# Patient Record
Sex: MALE | Race: OTHER | NOT HISPANIC OR LATINO | URBAN - METROPOLITAN AREA
[De-identification: names, ages, dates, MRNs, and addresses within clinical notes are randomized per-mention and may not be internally consistent; named-entity substitution may affect disease eponyms.]

---

## 2017-03-22 ENCOUNTER — INPATIENT (INPATIENT)
Facility: HOSPITAL | Age: 52
LOS: 1 days | Discharge: HOME | DRG: 287 | End: 2017-03-24
Attending: INTERNAL MEDICINE | Admitting: INTERNAL MEDICINE
Payer: COMMERCIAL

## 2017-03-22 VITALS
SYSTOLIC BLOOD PRESSURE: 134 MMHG | OXYGEN SATURATION: 96 % | RESPIRATION RATE: 18 BRPM | DIASTOLIC BLOOD PRESSURE: 92 MMHG | TEMPERATURE: 99 F | HEART RATE: 108 BPM

## 2017-03-22 PROCEDURE — 93010 ELECTROCARDIOGRAM REPORT: CPT | Mod: NC

## 2017-03-22 PROCEDURE — 99285 EMERGENCY DEPT VISIT HI MDM: CPT | Mod: 25

## 2017-03-22 PROCEDURE — 99222 1ST HOSP IP/OBS MODERATE 55: CPT

## 2017-03-22 PROCEDURE — 71010: CPT | Mod: 26

## 2017-03-22 PROCEDURE — 71275 CT ANGIOGRAPHY CHEST: CPT | Mod: 26

## 2017-03-22 PROCEDURE — 74174 CTA ABD&PLVS W/CONTRAST: CPT | Mod: 26

## 2017-03-22 PROCEDURE — 75574 CT ANGIO HRT W/3D IMAGE: CPT | Mod: 26

## 2017-03-22 RX ORDER — ASPIRIN/CALCIUM CARB/MAGNESIUM 324 MG
162 TABLET ORAL DAILY
Qty: 0 | Refills: 0 | Status: DISCONTINUED | OUTPATIENT
Start: 2017-03-22 | End: 2017-03-23

## 2017-03-22 RX ORDER — NITROGLYCERIN 6.5 MG
0.4 CAPSULE, EXTENDED RELEASE ORAL
Qty: 0 | Refills: 0 | Status: DISCONTINUED | OUTPATIENT
Start: 2017-03-22 | End: 2017-03-24

## 2017-03-22 RX ORDER — CLOPIDOGREL BISULFATE 75 MG/1
600 TABLET, FILM COATED ORAL ONCE
Qty: 0 | Refills: 0 | Status: COMPLETED | OUTPATIENT
Start: 2017-03-22 | End: 2017-03-22

## 2017-03-22 RX ORDER — SODIUM CHLORIDE 9 MG/ML
1000 INJECTION INTRAMUSCULAR; INTRAVENOUS; SUBCUTANEOUS ONCE
Qty: 0 | Refills: 0 | Status: COMPLETED | OUTPATIENT
Start: 2017-03-22 | End: 2017-03-22

## 2017-03-22 RX ORDER — LOSARTAN POTASSIUM 100 MG/1
25 TABLET, FILM COATED ORAL DAILY
Qty: 0 | Refills: 0 | Status: DISCONTINUED | OUTPATIENT
Start: 2017-03-22 | End: 2017-03-24

## 2017-03-22 RX ORDER — ATORVASTATIN CALCIUM 80 MG/1
80 TABLET, FILM COATED ORAL ONCE
Qty: 0 | Refills: 0 | Status: COMPLETED | OUTPATIENT
Start: 2017-03-22 | End: 2017-03-22

## 2017-03-22 RX ORDER — METOPROLOL TARTRATE 50 MG
50 TABLET ORAL ONCE
Qty: 0 | Refills: 0 | Status: COMPLETED | OUTPATIENT
Start: 2017-03-22 | End: 2017-03-22

## 2017-03-22 RX ORDER — ACETAMINOPHEN 500 MG
650 TABLET ORAL EVERY 6 HOURS
Qty: 0 | Refills: 0 | Status: DISCONTINUED | OUTPATIENT
Start: 2017-03-22 | End: 2017-03-24

## 2017-03-22 RX ORDER — MORPHINE SULFATE 50 MG/1
2 CAPSULE, EXTENDED RELEASE ORAL ONCE
Qty: 0 | Refills: 0 | Status: DISCONTINUED | OUTPATIENT
Start: 2017-03-22 | End: 2017-03-22

## 2017-03-22 RX ORDER — CARVEDILOL PHOSPHATE 80 MG/1
6.25 CAPSULE, EXTENDED RELEASE ORAL EVERY 12 HOURS
Qty: 0 | Refills: 0 | Status: DISCONTINUED | OUTPATIENT
Start: 2017-03-22 | End: 2017-03-24

## 2017-03-22 RX ADMIN — Medication 1 MILLIGRAM(S): at 16:00

## 2017-03-22 RX ADMIN — ATORVASTATIN CALCIUM 80 MILLIGRAM(S): 80 TABLET, FILM COATED ORAL at 21:28

## 2017-03-22 RX ADMIN — Medication 50 MILLIGRAM(S): at 16:15

## 2017-03-22 RX ADMIN — CLOPIDOGREL BISULFATE 600 MILLIGRAM(S): 75 TABLET, FILM COATED ORAL at 21:28

## 2017-03-22 RX ADMIN — Medication 162 MILLIGRAM(S): at 21:28

## 2017-03-22 RX ADMIN — Medication 50 MILLIGRAM(S): at 15:10

## 2017-03-22 RX ADMIN — SODIUM CHLORIDE 1000 MILLILITER(S): 9 INJECTION INTRAMUSCULAR; INTRAVENOUS; SUBCUTANEOUS at 15:10

## 2017-03-22 RX ADMIN — MORPHINE SULFATE 2 MILLIGRAM(S): 50 CAPSULE, EXTENDED RELEASE ORAL at 15:10

## 2017-03-22 RX ADMIN — MORPHINE SULFATE 2 MILLIGRAM(S): 50 CAPSULE, EXTENDED RELEASE ORAL at 16:15

## 2017-03-22 NOTE — H&P ADULT - HISTORY OF PRESENT ILLNESS
1.  The calcium score is minimally elevated at 3 Agatston units, which is   at the 59th percentile, adjusted for age, gender and race.  2.  There is moderate stenosis in the proximal LAD  and the ramus   intermedius due to noncalcified plaque.  3.  Dyskinesis of the anteroseptum, with mildly reduced ejection   fraction. Correlation with echocardiography is recommended. Patient is a 51 year old male with PMHx of ETOH abuse in the past(sober 4 years), depression, anxiety, HTN, recently found to have an LVEF of 38% by echo at [facility] with no evidence of CHF and ischemic work up had not been perused. Patient began to develop chest pain this afternoon which he describes as a substernal pressure which came and went several times at rest lasting only seconds. Finally pain became constant and patient presented to ED. Currently he is comfortable. He does admit to an episode of PND a few weeks ago but denies SOB, orthopnea, palps. LE edema, dizziness or syncope. In ED troponin was negative and EKG did not reveal any ischemic changes. CCTA was performed and revealed 1.  The calcium score is minimally elevated at 3 Agatston units, which is at the 59th percentile, adjusted for age, gender and race.  2.  There is moderate stenosis in the proximal LAD  and the ramus   intermedius due to noncalcified plaque.  3.  Dyskinesis of the anteroseptum, with mildly reduced ejection   fraction. Correlation with echocardiography is recommended.  He is being admitted for Cardiac catherization.

## 2017-03-22 NOTE — H&P ADULT - NSHPLABSRESULTS_GEN_ALL_CORE
13.3   9.8   )-----------( 191      ( 22 Mar 2017 15:01 )             37.5   Comprehensive Metabolic Panel (03.22.17 @ 15:01)    Sodium, Serum: 131 mmoL/L    Potassium, Serum: 4.4 mmoL/L    Chloride, Serum: 95 mmoL/L    Carbon Dioxide, Serum: 29 mmoL/L    Anion Gap, Serum: 7 mmoL/L    Blood Urea Nitrogen, Serum: 14 mg/dL    Creatinine, Serum: 1.07 mg/dL    Glucose, Serum: 80 mg/dL    Calcium, Total Serum: 8.8 mg/dL    Protein Total, Serum: 7.2 g/dL    Albumin, Serum: 3.9 g/dL    Bilirubin Total, Serum: 0.9 mg/dL    Alkaline Phosphatase, Serum: 74 U/L    Aspartate Aminotransferase (AST/SGOT): 36 U/L    Alanine Aminotransferase (ALT/SGPT): 61 U/L    eGFR if Non : 80: Interpretative comment  The units for eGFR are ml/min/1.73m2 (normalized body surface area). The  eGFR is calculated from a serum creatinine using the CKD-EPI equation.  Other variables required for calculation are race, age and sex. Among  patients w80: ith chronic kidney disease (CKD), the eGFR is useful in  determining the stage of disease according to KDOQI CKD classification.  All eGFR results are reported numerically with the following  interpretation.          GFR                    With80:                  Without     (ml/min/1.73 m2)    Kidney Damage       Kidney Damage        >= 90                    Stage 1                     Normal        60-89                    Stage 2                     Decreased GFR        :      Stage 3                     Stage 3        15-29                    Stage 4                     Stage 4        < 15                      Stage 5                     Stage 5  Each stage of CKD assumes that the associated GFR level has been in  eff80: ect for at least 3 months. Determination of stages one and two (with  eGFR > 59 ml/min/m2) requires estimation of kidney damage for at least 3  months as defined by structural or functional abnormalities.  Limitations: All estimates of GFR will be les80: s accurate for patients at  extremes of muscle mass (including but not limited to frail elderly,  critically ill, or cancer patients), those with unusual diets, and those  with conditions associated with reduced secretion or extrarenal  elimination of80:  creatinine. The eGFR equation is not recommended for use  in patients with unstable creatinine levels. mL/min/1.73M2    eGFR if African American: 93 mL/min/1.73M2    Troponin I, Serum (03.22.17 @ 15:01)    Troponin I, Serum: <0.015: Consider AMI if greater than or equal to 0.600 ng/mL  A joint committee from the American College of Cardiology and the  American Heart Association has defined the short-term risk of death or  nonfatal myocardial infarction in patients with unstable a<0.015: ngina, in  association with troponin levels as follows:  Low risk with normal levels 0.015 - 0.045 ng/mL  Intermediate risk when slightly elevated but less than 0.100 ng/mL  High risk with greater than or equal 0.100 ng/mL  Other conditions which can<0.015: lead to myocardial injury, such as cardiac  contusion and myocarditis can cause cardiac troponin I elevations. ng/mL

## 2017-03-22 NOTE — H&P ADULT - ASSESSMENT
Patient is a 51 year old male with PMHx of ETOH abuse in the past(sober 4 years), depression, anxiety, HTN, recently found to have an LVEF of 38% by echo at [facility] with no evidence of CHF and ischemic work up had not been perused. Patient began to develop chest pain this afternoon which he describes as a substernal pressure which came and went several times at rest lasting only seconds. Finally pain became constant and patient presented to ED. Currently he is comfortable. He does admit to an episode of PND a few weeks ago but denies SOB, orthopnea, palps. LE edema, dizziness or syncope. In ED troponin was negative and EKG did not reveal any ischemic changes. CCTA was performed and revealed 1.  The calcium score is minimally elevated at 3 Agatston units, which is at the 59th percentile, adjusted for age, gender and race.  2.  There is moderate stenosis in the proximal LAD  and the ramus   intermedius due to noncalcified plaque.  3.  Dyskinesis of the anteroseptum, with mildly reduced ejection   fraction. Correlation with echocardiography is recommended.  He is being admitted for Cardiac catherization.    Admit to tele  Plan for LHC in AM  Cont BB/ACE  ASA/Plavix 600mg loaded in ED  Check Lipids

## 2017-03-22 NOTE — ED ADULT NURSE NOTE - OBJECTIVE STATEMENT
Pt BIBA CO CP 7/10 since 1145 this AM.  Pt admin Sublingual Nitro x3 and PO  mg.  Pt states "Edith been having intermittent sharp left sided CP, but this morning it was much worse."  Pt also CO associated Nausea.  Pt denies Vomiting, Diarrhea, SOB, Fevers at this time.  EKG obtained upon arrival to ED.

## 2017-03-22 NOTE — H&P ADULT - ATTENDING COMMENTS
PATIENT SEEN AND EXAMINED.  AGREE WITH NOTE ABOVE.  CARDIOMYOPATHY, UNKNOWN ETIOLOGY, +CAD BY CCTA    PLAN AS ABOVE  CATH

## 2017-03-22 NOTE — ED PROVIDER NOTE - OBJECTIVE STATEMENT
51 m co cp- cp Left sided pressure started while at rest- intermittent sharp pain  with numbness in his Left hand- no sob no n/v no rad to back/abd  pain was intermittent- then became constant- has long standing hx of htn- on arb and b-blocker- got ntg x3 and asa 162- improved from 8 to a 5  partial allev w ntg  no exacerbating  sx started at rest at his desk

## 2017-03-22 NOTE — H&P ADULT - NSHPREVIEWOFSYSTEMS_GEN_ALL_CORE
Review of Systems: Review of Systems:  · General	negative  · Skin/Breast	negative  · Ophthalmologic	negative  · ENMT	negative  · Negative Respiratory and Thorax Symptoms	no wheezing; no hemoptysis; no pleuritic chest pain  · Respiratory and Thorax Symptoms	dyspnea  cough  · Cardiovascular Symptoms see HPI  · Gastrointestinal	negative  · Genitourinary	negative  · Musculoskeletal	negative  · Neurological	negative  · Psychiatric	negative  · Hematology/Lymphatics	negative  · Endocrine	negative  · Allergic/Immunologic	negative

## 2017-03-22 NOTE — ED ADULT TRIAGE NOTE - CHIEF COMPLAINT QUOTE
biba from work for acute onset substernal chest pain and left arm pain. pt given 3 nitro and 162mg ASA by EMS

## 2017-03-22 NOTE — ED ADULT NURSE NOTE - PMH
Ejection fraction < 50%  35%, per pt on 3/22/17  HTN (hypertension)    OCD (obsessive compulsive disorder)

## 2017-03-23 DIAGNOSIS — F42.9 OBSESSIVE-COMPULSIVE DISORDER, UNSPECIFIED: ICD-10-CM

## 2017-03-23 DIAGNOSIS — I10 ESSENTIAL (PRIMARY) HYPERTENSION: ICD-10-CM

## 2017-03-23 DIAGNOSIS — I20.0 UNSTABLE ANGINA: ICD-10-CM

## 2017-03-23 PROCEDURE — 93306 TTE W/DOPPLER COMPLETE: CPT | Mod: 26

## 2017-03-23 PROCEDURE — 99231 SBSQ HOSP IP/OBS SF/LOW 25: CPT

## 2017-03-23 PROCEDURE — 93458 L HRT ARTERY/VENTRICLE ANGIO: CPT | Mod: 26

## 2017-03-23 PROCEDURE — 93571 IV DOP VEL&/PRESS C FLO 1ST: CPT | Mod: 26,LD

## 2017-03-23 RX ORDER — ASPIRIN/CALCIUM CARB/MAGNESIUM 324 MG
81 TABLET ORAL DAILY
Qty: 0 | Refills: 0 | Status: DISCONTINUED | OUTPATIENT
Start: 2017-03-23 | End: 2017-03-24

## 2017-03-23 RX ORDER — ATORVASTATIN CALCIUM 80 MG/1
20 TABLET, FILM COATED ORAL AT BEDTIME
Qty: 0 | Refills: 0 | Status: DISCONTINUED | OUTPATIENT
Start: 2017-03-23 | End: 2017-03-24

## 2017-03-23 RX ORDER — CLOPIDOGREL BISULFATE 75 MG/1
75 TABLET, FILM COATED ORAL DAILY
Qty: 0 | Refills: 0 | Status: DISCONTINUED | OUTPATIENT
Start: 2017-03-23 | End: 2017-03-23

## 2017-03-23 RX ORDER — SODIUM CHLORIDE 9 MG/ML
1000 INJECTION INTRAMUSCULAR; INTRAVENOUS; SUBCUTANEOUS
Qty: 0 | Refills: 0 | Status: DISCONTINUED | OUTPATIENT
Start: 2017-03-23 | End: 2017-03-24

## 2017-03-23 RX ADMIN — Medication 60 MILLIGRAM(S): at 07:16

## 2017-03-23 RX ADMIN — LOSARTAN POTASSIUM 25 MILLIGRAM(S): 100 TABLET, FILM COATED ORAL at 07:17

## 2017-03-23 RX ADMIN — SODIUM CHLORIDE 50 MILLILITER(S): 9 INJECTION INTRAMUSCULAR; INTRAVENOUS; SUBCUTANEOUS at 13:35

## 2017-03-23 RX ADMIN — Medication 162 MILLIGRAM(S): at 07:38

## 2017-03-23 RX ADMIN — ATORVASTATIN CALCIUM 20 MILLIGRAM(S): 80 TABLET, FILM COATED ORAL at 21:01

## 2017-03-23 RX ADMIN — CARVEDILOL PHOSPHATE 6.25 MILLIGRAM(S): 80 CAPSULE, EXTENDED RELEASE ORAL at 07:16

## 2017-03-23 RX ADMIN — CARVEDILOL PHOSPHATE 6.25 MILLIGRAM(S): 80 CAPSULE, EXTENDED RELEASE ORAL at 18:22

## 2017-03-23 NOTE — PROGRESS NOTE ADULT - PROBLEM SELECTOR PLAN 1
-EKG non-ischemic, CE negative x 3  -CTA Coronaries with mod stenosis in pLAD and ramus, dyskinesis of anteroseptum with mildly reduced EF. CTA neg for PE/Dissection.   -s/p Lipitor 80mg in ED, ASA/Plavix loaded  -NPO for LHC with Dr. Cano(precath consented) -EKG non-ischemic, CE negative x 2  -CTA Coronaries with mod stenosis in pLAD and ramus, dyskinesis of anteroseptum with mildly reduced EF. CTA neg for PE/Dissection.   -s/p Lipitor 80mg in ED, ASA/Plavix loaded  -NPO for LHC with Dr. Cano(precath consented)

## 2017-03-23 NOTE — PROCEDURE NOTE - SUPERVISORY STATEMENT
NICM, LIKELY DUE TO ETOH ABUSE IN PAST  - MEDICAL THERAPY WITH BB, ACE;  EPLERNONE (START THIS AS OUT PATIENT ONCE NORMAL POTASSIUM ON ACE)  - D/C PLAVIX, CONTINUE STATIN  - CONSIDER OUT PATIENT cMRI WITH CONTRAST IN FUTURE  - GENTLE IV HYDRATION (NS AT 50 CC/HR FOR 10 HOURS); CHECK AM LABS

## 2017-03-23 NOTE — PROCEDURE NOTE - ADDITIONAL PROCEDURE DETAILS
LM - NORMAL  LAD - 40% MID  RAMUS - NORMAL, < 1.5 MM VESSEL  LCX - LUMINAL IRREGULARITIES  RCA - LUMINAL IRREGULARITIES    LVEF: 35%; LVEDP 11mmHg    DIAGNOSTIC INVASIVE TESTING: FFR OF MID LAD - 0.94

## 2017-03-23 NOTE — PROGRESS NOTE ADULT - ATTENDING COMMENTS
PATIENT SEEN AND EXAMINED    AGREE WITH ABOVE    P/E: NO JVD, CTAB, NO S3, NO MURMUR, +BS, SOFT, NO CCE

## 2017-03-23 NOTE — PROGRESS NOTE ADULT - PROBLEM SELECTOR PLAN 3
-Pt on Anafranil 225mg daily(not available in house- pt does not have bottle with him)  -Continue Paroxetine 60mg daily for depression    Dispo: Pending Cath results. Case d/w Dr. Cano

## 2017-03-23 NOTE — PROGRESS NOTE ADULT - SUBJECTIVE AND OBJECTIVE BOX
Interventional Cardiology PA Adult Progress Note    Subjective Assessment: Pt seen this AM at bedside. Denies any CP, SOB, palpitations, LE edema  	  MEDICATIONS:  losartan 25milliGRAM(s) Oral daily  carvedilol 6.25milliGRAM(s) Oral every 12 hours  nitroglycerin     SubLingual 0.4milliGRAM(s) SubLingual every 5 minutes PRN        acetaminophen   Tablet. 650milliGRAM(s) Oral every 6 hours PRN  PARoxetine 60milliGRAM(s) Oral daily        aspirin  chewable 162milliGRAM(s) Oral daily      	    [PHYSICAL EXAM:  TELEMETRY: SR 70s no acute events o/n  T(C): 36.4, Max: 37.4 (03-22 @ 14:17)  HR: 77 (63 - 108)  BP: 142/77 (133/70 - 150/89)  RR: 18 (18 - 18)  SpO2: 99% (96% - 100%)  Wt(kg): --  I&O's Summary    Height (cm): 177.8 (03-23 @ 00:22)  Weight (kg): 76.8 (03-23 @ 00:22)  BMI (kg/m2): 24.3 (03-23 @ 00:22)  BSA (m2): 1.94 (03-23 @ 00:22)                                    Appearance: Normal	  HEENT:   Normal oral mucosa, PERRL, EOMI	  Neck: Supple, no JVD  Cardiovascular: Normal S1 S2, No JVD, No murmurs,   Respiratory: Lungs clear to auscultation  Gastrointestinal:  Soft, Non-tender, + BS	  Skin: No rashes, No ecchymoses, No cyanosis  Extremities: Normal range of motion, No clubbing, cyanosis or edema  Vascular: Peripheral pulses palpable 2+ bilaterally  Neurologic: Non-focal  Psychiatry: A & O x 3, Mood & affect appropriate      	    ECG:  	  RADIOLOGY:   DIAGNOSTIC TESTING:  [ ] Echocardiogram:  [ ]  Catheterization: pending  [ ] Stress Test:    [ ] ABILIO  OTHER: 	    LABS:	 	  CARDIAC MARKERS:  Troponin I, Serum: <0.015 ng/mL (03-23 @ 07:08)  Troponin I, Serum: <0.015 ng/mL (03-22 @ 15:01)          Troponin I, Serum: <0.015 ng/mL (03-23 @ 07:08)  Troponin I, Serum: <0.015 ng/mL (03-22 @ 15:01)                          13.8   5.8   )-----------( 188      ( 23 Mar 2017 07:08 )             39.7     23 Mar 2017 07:08    133    |  101    |  19     ----------------------------<  71     4.1     |  25     |  1.30     Ca    8.4        23 Mar 2017 07:08    TPro  7.2    /  Alb  3.9    /  TBili  0.9    /  DBili  x      /  AST  36     /  ALT  61     /  AlkPhos  74     22 Mar 2017 15:01    proBNP:   Lipid Profile:   HgA1c:   TSH:   PT/INR - ( 22 Mar 2017 15:01 )   PT: 12.3 sec;   INR: 1.11          PTT - ( 22 Mar 2017 15:01 )  PTT:34.4 sec      Dispo: Pending Cath results. Case d/w Dr. Cano

## 2017-03-23 NOTE — PROGRESS NOTE ADULT - ASSESSMENT
51 year old male with PMHx of ETOH abuse in the past(sober 4 years), depression, anxiety, HTN, recently found to have an LVEF of 38% by echo at [facility] with no evidence of CHF and ischemic work up had not been done presented to [facility] with unstable anginal symptoms found to have abnormal coronary CTA admitted for further ischemic workup.

## 2017-03-24 VITALS — TEMPERATURE: 98 F

## 2017-03-24 LAB
ANION GAP SERPL CALC-SCNC: 7 MMOL/L — LOW (ref 9–16)
BUN SERPL-MCNC: 18 MG/DL — SIGNIFICANT CHANGE UP (ref 7–23)
CALCIUM SERPL-MCNC: 8.7 MG/DL — SIGNIFICANT CHANGE UP (ref 8.5–10.5)
CHLORIDE SERPL-SCNC: 103 MMOL/L — SIGNIFICANT CHANGE UP (ref 96–108)
CO2 SERPL-SCNC: 28 MMOL/L — SIGNIFICANT CHANGE UP (ref 22–31)
CREAT SERPL-MCNC: 1.13 MG/DL — SIGNIFICANT CHANGE UP (ref 0.5–1.3)
GLUCOSE SERPL-MCNC: 77 MG/DL — SIGNIFICANT CHANGE UP (ref 70–99)
HCT VFR BLD CALC: 38.9 % — LOW (ref 39–50)
HGB BLD-MCNC: 13.4 G/DL — SIGNIFICANT CHANGE UP (ref 13–17)
MAGNESIUM SERPL-MCNC: 2.1 MG/DL — SIGNIFICANT CHANGE UP (ref 1.6–2.4)
MCHC RBC-ENTMCNC: 32.7 PG — SIGNIFICANT CHANGE UP (ref 27–34)
MCHC RBC-ENTMCNC: 34.4 G/DL — SIGNIFICANT CHANGE UP (ref 32–36)
MCV RBC AUTO: 94.9 FL — SIGNIFICANT CHANGE UP (ref 80–100)
PLATELET # BLD AUTO: 183 K/UL — SIGNIFICANT CHANGE UP (ref 150–400)
POTASSIUM SERPL-MCNC: 4.2 MMOL/L — SIGNIFICANT CHANGE UP (ref 3.5–5.3)
POTASSIUM SERPL-SCNC: 4.2 MMOL/L — SIGNIFICANT CHANGE UP (ref 3.5–5.3)
RBC # BLD: 4.1 M/UL — LOW (ref 4.2–5.8)
RBC # FLD: 12.5 % — SIGNIFICANT CHANGE UP (ref 10.3–16.9)
SODIUM SERPL-SCNC: 138 MMOL/L — SIGNIFICANT CHANGE UP (ref 135–145)
WBC # BLD: 6.5 K/UL — SIGNIFICANT CHANGE UP (ref 3.8–10.5)
WBC # FLD AUTO: 6.5 K/UL — SIGNIFICANT CHANGE UP (ref 3.8–10.5)

## 2017-03-24 PROCEDURE — 71045 X-RAY EXAM CHEST 1 VIEW: CPT

## 2017-03-24 PROCEDURE — 75574 CT ANGIO HRT W/3D IMAGE: CPT

## 2017-03-24 PROCEDURE — 83735 ASSAY OF MAGNESIUM: CPT

## 2017-03-24 PROCEDURE — C1887: CPT

## 2017-03-24 PROCEDURE — 80061 LIPID PANEL: CPT

## 2017-03-24 PROCEDURE — 85730 THROMBOPLASTIN TIME PARTIAL: CPT

## 2017-03-24 PROCEDURE — 93005 ELECTROCARDIOGRAM TRACING: CPT

## 2017-03-24 PROCEDURE — 96374 THER/PROPH/DIAG INJ IV PUSH: CPT | Mod: XU

## 2017-03-24 PROCEDURE — 85610 PROTHROMBIN TIME: CPT

## 2017-03-24 PROCEDURE — 71275 CT ANGIOGRAPHY CHEST: CPT

## 2017-03-24 PROCEDURE — 82553 CREATINE MB FRACTION: CPT

## 2017-03-24 PROCEDURE — 93306 TTE W/DOPPLER COMPLETE: CPT

## 2017-03-24 PROCEDURE — 85027 COMPLETE CBC AUTOMATED: CPT

## 2017-03-24 PROCEDURE — 36415 COLL VENOUS BLD VENIPUNCTURE: CPT

## 2017-03-24 PROCEDURE — 85025 COMPLETE CBC W/AUTO DIFF WBC: CPT

## 2017-03-24 PROCEDURE — 80048 BASIC METABOLIC PNL TOTAL CA: CPT

## 2017-03-24 PROCEDURE — 80053 COMPREHEN METABOLIC PANEL: CPT

## 2017-03-24 PROCEDURE — C1769: CPT

## 2017-03-24 PROCEDURE — 99238 HOSP IP/OBS DSCHRG MGMT 30/<: CPT

## 2017-03-24 PROCEDURE — 74174 CTA ABD&PLVS W/CONTRAST: CPT

## 2017-03-24 PROCEDURE — C1889: CPT

## 2017-03-24 PROCEDURE — 84484 ASSAY OF TROPONIN QUANT: CPT

## 2017-03-24 PROCEDURE — 96375 TX/PRO/DX INJ NEW DRUG ADDON: CPT | Mod: XU

## 2017-03-24 PROCEDURE — 82550 ASSAY OF CK (CPK): CPT

## 2017-03-24 PROCEDURE — 99285 EMERGENCY DEPT VISIT HI MDM: CPT | Mod: 25

## 2017-03-24 RX ORDER — CARVEDILOL PHOSPHATE 80 MG/1
1 CAPSULE, EXTENDED RELEASE ORAL
Qty: 0 | Refills: 0 | COMMUNITY
Start: 2017-03-24

## 2017-03-24 RX ORDER — LOSARTAN POTASSIUM 100 MG/1
0 TABLET, FILM COATED ORAL
Qty: 0 | Refills: 0 | COMMUNITY

## 2017-03-24 RX ORDER — ASPIRIN/CALCIUM CARB/MAGNESIUM 324 MG
1 TABLET ORAL
Qty: 0 | Refills: 0 | COMMUNITY
Start: 2017-03-24

## 2017-03-24 RX ORDER — ATORVASTATIN CALCIUM 80 MG/1
1 TABLET, FILM COATED ORAL
Qty: 30 | Refills: 0 | OUTPATIENT
Start: 2017-03-24 | End: 2017-04-23

## 2017-03-24 RX ORDER — CARVEDILOL PHOSPHATE 80 MG/1
0 CAPSULE, EXTENDED RELEASE ORAL
Qty: 0 | Refills: 0 | COMMUNITY

## 2017-03-24 RX ORDER — LOSARTAN POTASSIUM 100 MG/1
1 TABLET, FILM COATED ORAL
Qty: 0 | Refills: 0 | COMMUNITY
Start: 2017-03-24

## 2017-03-24 RX ADMIN — Medication 60 MILLIGRAM(S): at 05:47

## 2017-03-24 RX ADMIN — Medication 81 MILLIGRAM(S): at 11:20

## 2017-03-24 RX ADMIN — CARVEDILOL PHOSPHATE 6.25 MILLIGRAM(S): 80 CAPSULE, EXTENDED RELEASE ORAL at 05:47

## 2017-03-24 RX ADMIN — LOSARTAN POTASSIUM 25 MILLIGRAM(S): 100 TABLET, FILM COATED ORAL at 05:47

## 2017-03-24 NOTE — DISCHARGE NOTE ADULT - PLAN OF CARE
You came in with chest pain and were found to have an abnormal coronary cat scan and due to the history of having a low heart function underwent a Cardiac Catheterization which showed non-obstructive disease. Continue taking Carvedilol 6.25mg every Continue your home dose of Losartan 25mg daily. You have a history of elevated blood pressure and you should continue your blood pressure medications as prescribed. Continue your Paroxetine 60mg daily as prescribed. Continue taking Carvedilol 6.25mg every 12 hours daily as well as Lipitor(Atorvastatin) 20mg daily.  You underwent a coronary angiogram and should wait 3 days before returning to ordinary activities. Do not drive for 2 days. Consult your doctor before returning to vigorous activity. You may return to work in 3-5 days. The catheter from your wrist was removed and you should remove the dressing in 24 hours. You may shower once the dressing is removed, but avoid baths, hot tubs, or swimming for 5 days to prevent infection. If you notice bleeding from the site, hardening and pain at the site, drainage or redness from the site, coolness/paleness of the extremity, swelling, or fever, please call [phone].Please follow up with your Cardiologist Dr. Abernathy at [facility] in 1-2 weeks. All of your needed prescriptions have been sent electronically to your pharmacy.

## 2017-03-24 NOTE — DISCHARGE NOTE ADULT - CARE PLAN
Principal Discharge DX:	Non-ischemic cardiomyopathy  Goal:	You came in with chest pain and were found to have an abnormal coronary cat scan and due to the history of having a low heart function underwent a Cardiac Catheterization which showed non-obstructive disease.  Instructions for follow-up, activity and diet:	Continue taking Carvedilol 6.25mg every  Secondary Diagnosis:	HTN (hypertension)  Secondary Diagnosis:	Depression Principal Discharge DX:	Non-ischemic cardiomyopathy  Goal:	You came in with chest pain and were found to have an abnormal coronary cat scan and due to the history of having a low heart function underwent a Cardiac Catheterization which showed non-obstructive disease.  Instructions for follow-up, activity and diet:	Continue taking Carvedilol 6.25mg every 12 hours daily as well as Lipitor(Atorvastatin) 20mg daily.  You underwent a coronary angiogram and should wait 3 days before returning to ordinary activities. Do not drive for 2 days. Consult your doctor before returning to vigorous activity. You may return to work in 3-5 days. The catheter from your wrist was removed and you should remove the dressing in 24 hours. You may shower once the dressing is removed, but avoid baths, hot tubs, or swimming for 5 days to prevent infection. If you notice bleeding from the site, hardening and pain at the site, drainage or redness from the site, coolness/paleness of the extremity, swelling, or fever, please call [phone].Please follow up with your Cardiologist Dr. Abernathy at [facility] in 1-2 weeks. All of your needed prescriptions have been sent electronically to your pharmacy.  Secondary Diagnosis:	HTN (hypertension)  Goal:	Continue your home dose of Losartan 25mg daily.  Instructions for follow-up, activity and diet:	You have a history of elevated blood pressure and you should continue your blood pressure medications as prescribed.  Secondary Diagnosis:	Depression  Goal:	Continue your Paroxetine 60mg daily as prescribed. Principal Discharge DX:	Non-ischemic cardiomyopathy  Goal:	You came in with chest pain and were found to have an abnormal coronary cat scan and due to the history of having a low heart function underwent a Cardiac Catheterization which showed non-obstructive disease.  Instructions for follow-up, activity and diet:	Continue taking Carvedilol 6.25mg every 12 hours daily as well as Lipitor(Atorvastatin) 20mg daily.  You underwent a coronary angiogram and should wait 3 days before returning to ordinary activities. Do not drive for 2 days. Consult your doctor before returning to vigorous activity. You may return to work in 3-5 days. The catheter from your wrist was removed and you should remove the dressing in 24 hours. You may shower once the dressing is removed, but avoid baths, hot tubs, or swimming for 5 days to prevent infection. If you notice bleeding from the site, hardening and pain at the site, drainage or redness from the site, coolness/paleness of the extremity, swelling, or fever, please call [phone].Please follow up with your Cardiologist Dr. Abernathy at [facility] in 1-2 weeks. All of your needed prescriptions have been sent electronically to your pharmacy.  Secondary Diagnosis:	HTN (hypertension)  Goal:	Continue your home dose of Losartan 25mg daily.  Instructions for follow-up, activity and diet:	You have a history of elevated blood pressure and you should continue your blood pressure medications as prescribed.  Secondary Diagnosis:	Depression  Goal:	Continue your Paroxetine 60mg daily as prescribed. Principal Discharge DX:	Non-ischemic cardiomyopathy  Goal:	You came in with chest pain and were found to have an abnormal coronary cat scan and due to the history of having a low heart function underwent a Cardiac Catheterization which showed non-obstructive disease.  Instructions for follow-up, activity and diet:	Continue taking Carvedilol 6.25mg every 12 hours daily as well as Lipitor(Atorvastatin) 20mg daily.  You underwent a coronary angiogram and should wait 3 days before returning to ordinary activities. Do not drive for 2 days. Consult your doctor before returning to vigorous activity. You may return to work in 3-5 days. The catheter from your wrist was removed and you should remove the dressing in 24 hours. You may shower once the dressing is removed, but avoid baths, hot tubs, or swimming for 5 days to prevent infection. If you notice bleeding from the site, hardening and pain at the site, drainage or redness from the site, coolness/paleness of the extremity, swelling, or fever, please call [phone].Please follow up with your Cardiologist Dr. Abernathy at [facility] in 1-2 weeks. All of your needed prescriptions have been sent electronically to your pharmacy.  Secondary Diagnosis:	HTN (hypertension)  Goal:	Continue your home dose of Losartan 25mg daily.  Instructions for follow-up, activity and diet:	You have a history of elevated blood pressure and you should continue your blood pressure medications as prescribed.  Secondary Diagnosis:	Depression  Goal:	Continue your Paroxetine 60mg daily as prescribed. Principal Discharge DX:	Non-ischemic cardiomyopathy  Goal:	You came in with chest pain and were found to have an abnormal coronary cat scan and due to the history of having a low heart function underwent a Cardiac Catheterization which showed non-obstructive disease.  Instructions for follow-up, activity and diet:	Continue taking Carvedilol 6.25mg every 12 hours daily as well as Lipitor(Atorvastatin) 20mg daily.  You underwent a coronary angiogram and should wait 3 days before returning to ordinary activities. Do not drive for 2 days. Consult your doctor before returning to vigorous activity. You may return to work in 3-5 days. The catheter from your wrist was removed and you should remove the dressing in 24 hours. You may shower once the dressing is removed, but avoid baths, hot tubs, or swimming for 5 days to prevent infection. If you notice bleeding from the site, hardening and pain at the site, drainage or redness from the site, coolness/paleness of the extremity, swelling, or fever, please call [phone].Please follow up with your Cardiologist Dr. Abernathy at [facility] in 1-2 weeks. All of your needed prescriptions have been sent electronically to your pharmacy.  Secondary Diagnosis:	HTN (hypertension)  Goal:	Continue your home dose of Losartan 25mg daily.  Instructions for follow-up, activity and diet:	You have a history of elevated blood pressure and you should continue your blood pressure medications as prescribed.  Secondary Diagnosis:	Depression  Goal:	Continue your Paroxetine 60mg daily as prescribed. Principal Discharge DX:	Non-ischemic cardiomyopathy  Goal:	You came in with chest pain and were found to have an abnormal coronary cat scan and due to the history of having a low heart function underwent a Cardiac Catheterization which showed non-obstructive disease.  Instructions for follow-up, activity and diet:	Continue taking Carvedilol 6.25mg every 12 hours daily as well as Lipitor(Atorvastatin) 20mg daily.  You underwent a coronary angiogram and should wait 3 days before returning to ordinary activities. Do not drive for 2 days. Consult your doctor before returning to vigorous activity. You may return to work in 3-5 days. The catheter from your wrist was removed and you should remove the dressing in 24 hours. You may shower once the dressing is removed, but avoid baths, hot tubs, or swimming for 5 days to prevent infection. If you notice bleeding from the site, hardening and pain at the site, drainage or redness from the site, coolness/paleness of the extremity, swelling, or fever, please call [phone].Please follow up with your Cardiologist Dr. Abernathy at [facility] in 1-2 weeks. All of your needed prescriptions have been sent electronically to your pharmacy.  Secondary Diagnosis:	HTN (hypertension)  Goal:	Continue your home dose of Losartan 25mg daily.  Instructions for follow-up, activity and diet:	You have a history of elevated blood pressure and you should continue your blood pressure medications as prescribed.  Secondary Diagnosis:	Depression  Goal:	Continue your Paroxetine 60mg daily as prescribed. Principal Discharge DX:	Non-ischemic cardiomyopathy  Goal:	You came in with chest pain and were found to have an abnormal coronary cat scan and due to the history of having a low heart function underwent a Cardiac Catheterization which showed non-obstructive disease.  Instructions for follow-up, activity and diet:	Continue taking Carvedilol 6.25mg every 12 hours daily as well as Lipitor(Atorvastatin) 20mg daily.  You underwent a coronary angiogram and should wait 3 days before returning to ordinary activities. Do not drive for 2 days. Consult your doctor before returning to vigorous activity. You may return to work in 3-5 days. The catheter from your wrist was removed and you should remove the dressing in 24 hours. You may shower once the dressing is removed, but avoid baths, hot tubs, or swimming for 5 days to prevent infection. If you notice bleeding from the site, hardening and pain at the site, drainage or redness from the site, coolness/paleness of the extremity, swelling, or fever, please call [phone].Please follow up with your Cardiologist Dr. Abernathy at [facility] in 1-2 weeks. All of your needed prescriptions have been sent electronically to your pharmacy.  Secondary Diagnosis:	HTN (hypertension)  Goal:	Continue your home dose of Losartan 25mg daily.  Instructions for follow-up, activity and diet:	You have a history of elevated blood pressure and you should continue your blood pressure medications as prescribed.  Secondary Diagnosis:	Depression  Goal:	Continue your Paroxetine 60mg daily as prescribed.

## 2017-03-24 NOTE — DISCHARGE NOTE ADULT - PROVIDER TOKENS
FREE:[LAST:[richard],FIRST:[fidencio],PHONE:[(   )    -],FAX:[(   )    -],ADDRESS:[Address: [address] [place] [address] [address], [place] [address]  Phone: [phone]]] FREE:[LAST:[DEV],FIRST:[NANI],PHONE:[(   )    -],FAX:[(   )    -],ADDRESS:[Address: [address] [place] [address]  Phone: [phone]]]

## 2017-03-24 NOTE — DISCHARGE NOTE ADULT - HOSPITAL COURSE
51 year old male with PMHx of ETOH abuse in the past(sober 4 years), depression, anxiety, HTN, recently found to have an LVEF of 38% by echo at [facility] with no evidence of CHF and ischemic work up had not been perused. Patient began to develop chest pain this afternoon which he describes as a substernal pressure which came and went several times at rest lasting only seconds. Finally pain became constant and patient presented to ED. Currently he is comfortable. He does admit to an episode of PND a few weeks ago but denies SOB, orthopnea, palps. LE edema, dizziness or syncope. In ED troponin was negative and EKG did not reveal any ischemic changes. CTA neg for PE/Dissection, CCTA was performed and revealed 1.  The calcium score is minimally elevated at 3 Agatston units, which is at the 59th percentile, adjusted for age, gender and race.2.  There is moderate stenosis in the proximal LAD  and the ramus intermedius due to noncalcified plaque.3.  Dyskinesis of the anteroseptum, with mildly reduced ejection fraction. Correlation with echocardiography is recommended. Pt admitted and underwent Cardiac Cath on 3/23/17 revealing non-ischemic cardiomyopathy with FFR of LAD to 0.94 likely ischemic 2/2 prior ETOH use, EF 35%, EDP 11.  R radial access site stable no hematoma or bleeding today.  Echo was performed after Cath with LV severely dilated, mod global hypokinesis, EF 35%, no significant valvular disease. Pt was hydrated at 50cc/hr x 10 hours post cath.  Pt seen with Dr. Cano this AM and to f/u with his o/p Cardiologist at [facility] Dr. Abernathy in 1-2 weeks.  Pt has Coreg, Losartan, ASA, Paxil at home and Lipitor 20mg sent to his o/p pharmacy.  Pt was given a copy of his Echo and Cath report as d/w Dr. Cano for his f/u at [facility].  All d/c ppw signed and in chart.

## 2017-03-24 NOTE — DISCHARGE NOTE ADULT - PATIENT PORTAL LINK FT
“You can access the [facility] Patient Portal, offered by [facility], by registering with the following website: [email or web address]”

## 2017-03-24 NOTE — DISCHARGE NOTE ADULT - CARE PROVIDER_API CALL
fidencio ramos  Address: [address] [place] [address] [address], [place] [address]  Phone: [phone]  Phone: (   )    -  Fax: (   )    - NANI MÉNDEZ  Address: [address] [place] [address]  Phone: [phone]  Phone: (   )    -  Fax: (   )    -

## 2017-03-24 NOTE — DISCHARGE NOTE ADULT - OTHER SIGNIFICANT FINDINGS
patient seen and examined yesterday prior to discharge    plan discussed at length    wishes to follow up with cardio at [facility]

## 2017-03-24 NOTE — DISCHARGE NOTE ADULT - MEDICATION SUMMARY - MEDICATIONS TO TAKE
I will START or STAY ON the medications listed below when I get home from the hospital:    aspirin 81 mg oral delayed release tablet  -- 1 tab(s) by mouth once a day  -- Indication: For Chest Pain    Cozaar 25 mg oral tablet  -- 1 tab(s) by mouth once a day  -- Indication: For HTN (hypertension)    Paxil 30 mg oral tablet  -- 2 tab(s) by mouth once a day  -- Indication: For Depression    atorvastatin 20 mg oral tablet  -- 1 tab(s) by mouth once a day (at bedtime)  -- Indication: For Hyperlipidemia    Coreg 6.25 mg oral tablet  -- 1 tab(s) by mouth every 12 hours  -- Indication: For HTN (hypertension)

## 2017-03-27 DIAGNOSIS — I42.8 OTHER CARDIOMYOPATHIES: ICD-10-CM

## 2017-03-27 DIAGNOSIS — Z88.0 ALLERGY STATUS TO PENICILLIN: ICD-10-CM

## 2017-03-27 DIAGNOSIS — I25.10 ATHEROSCLEROTIC HEART DISEASE OF NATIVE CORONARY ARTERY WITHOUT ANGINA PECTORIS: ICD-10-CM

## 2017-03-27 DIAGNOSIS — I10 ESSENTIAL (PRIMARY) HYPERTENSION: ICD-10-CM

## 2017-03-27 DIAGNOSIS — F10.21 ALCOHOL DEPENDENCE, IN REMISSION: ICD-10-CM

## 2017-03-27 DIAGNOSIS — Z28.21 IMMUNIZATION NOT CARRIED OUT BECAUSE OF PATIENT REFUSAL: ICD-10-CM

## 2017-03-27 DIAGNOSIS — F41.9 ANXIETY DISORDER, UNSPECIFIED: ICD-10-CM

## 2017-03-27 DIAGNOSIS — F42.9 OBSESSIVE-COMPULSIVE DISORDER, UNSPECIFIED: ICD-10-CM

## 2017-03-27 DIAGNOSIS — F32.9 MAJOR DEPRESSIVE DISORDER, SINGLE EPISODE, UNSPECIFIED: ICD-10-CM

## 2017-03-27 DIAGNOSIS — I20.0 UNSTABLE ANGINA: ICD-10-CM

## 2017-03-28 DIAGNOSIS — I25.110 ATHEROSCLEROTIC HEART DISEASE OF NATIVE CORONARY ARTERY WITH UNSTABLE ANGINA PECTORIS: ICD-10-CM

## 2023-10-25 NOTE — PATIENT PROFILE ADULT. - NSTOBACCONEVERSMOKERY/N_GEN_A
October 25, 2023      Gudelia Mcghee  [address]  [place] [address]        To Whom It May Concern:    Gudelia Mcghee was seen in our clinic. He may return to work without restrictions 10/27/23.      Sincerely,        Tavon Poole PA-C           No